# Patient Record
Sex: FEMALE | Race: BLACK OR AFRICAN AMERICAN | Employment: FULL TIME | URBAN - METROPOLITAN AREA
[De-identification: names, ages, dates, MRNs, and addresses within clinical notes are randomized per-mention and may not be internally consistent; named-entity substitution may affect disease eponyms.]

---

## 2022-06-02 ENCOUNTER — OFFICE VISIT (OUTPATIENT)
Dept: PULMONOLOGY | Age: 42
End: 2022-06-02
Payer: COMMERCIAL

## 2022-06-02 VITALS
HEIGHT: 60 IN | RESPIRATION RATE: 14 BRPM | OXYGEN SATURATION: 100 % | TEMPERATURE: 98 F | DIASTOLIC BLOOD PRESSURE: 75 MMHG | HEART RATE: 76 BPM | SYSTOLIC BLOOD PRESSURE: 118 MMHG | BODY MASS INDEX: 38.09 KG/M2 | WEIGHT: 194 LBS

## 2022-06-02 DIAGNOSIS — J18.9 PNEUMONIA DUE TO INFECTIOUS ORGANISM, UNSPECIFIED LATERALITY, UNSPECIFIED PART OF LUNG: ICD-10-CM

## 2022-06-02 DIAGNOSIS — R91.8 ABNORMAL CT LUNG SCREENING: Primary | ICD-10-CM

## 2022-06-02 DIAGNOSIS — R05.9 COUGH: ICD-10-CM

## 2022-06-02 DIAGNOSIS — R91.8 PULMONARY INFILTRATES: ICD-10-CM

## 2022-06-02 LAB
EXPIRATORY TIME: NORMAL
FEF 25-75% %PRED-PRE: NORMAL
FEF 25-75% PRED: NORMAL
FEF 25-75%-PRE: NORMAL
FEV1 %PRED-PRE: 83 %
FEV1 PRED: NORMAL
FEV1/FVC %PRED-PRE: NORMAL
FEV1/FVC PRED: NORMAL
FEV1/FVC: 69 %
FEV1: 1.79 L
FVC %PRED-PRE: 100 %
FVC PRED: NORMAL
FVC: 2.61 L
PEF %PRED-PRE: NORMAL
PEF PRED: NORMAL
PEF-PRE: NORMAL

## 2022-06-02 PROCEDURE — 94010 BREATHING CAPACITY TEST: CPT | Performed by: INTERNAL MEDICINE

## 2022-06-02 PROCEDURE — 99205 OFFICE O/P NEW HI 60 MIN: CPT | Performed by: INTERNAL MEDICINE

## 2022-06-02 RX ORDER — QUETIAPINE FUMARATE 100 MG/1
TABLET, FILM COATED ORAL
COMMUNITY
Start: 2022-05-11

## 2022-06-02 RX ORDER — SERTRALINE HYDROCHLORIDE 100 MG/1
100 TABLET, FILM COATED ORAL DAILY
COMMUNITY
Start: 2021-06-02

## 2022-06-02 RX ORDER — BENZONATATE 200 MG/1
200 CAPSULE ORAL 3 TIMES DAILY PRN
COMMUNITY
Start: 2022-05-18

## 2022-06-02 RX ORDER — HYDROXYZINE HYDROCHLORIDE 10 MG/1
10 TABLET, FILM COATED ORAL 3 TIMES DAILY PRN
COMMUNITY
Start: 2021-06-02

## 2022-06-02 ASSESSMENT — PULMONARY FUNCTION TESTS
FEV1_PERCENT_PREDICTED_PRE: 83
FEV1/FVC: 69
FEV1: 1.79
FVC: 2.61
FVC_PERCENT_PREDICTED_PRE: 100

## 2022-06-02 ASSESSMENT — ENCOUNTER SYMPTOMS
COUGH: 1
SHORTNESS OF BREATH: 1
DIARRHEA: 1

## 2022-06-02 NOTE — PROGRESS NOTES
[address] [facility]. [address], [address] [facility]  [phone]    Patient Name:  Coco Oliva  YOB: 1980      Office Visit 6/2/2022    CHIEF COMPLAINT:    Chief Complaint   Patient presents with    Breathing Problem       HISTORY OF PRESENT ILLNESS:      Patient is 42years old -American female who is here today and referral of Dr. Ginette Beckford for the evaluation of pneumonia. Has history of breath cause in the past and has been on eloquence for long time. She reports symptoms in April she developed shortness of breath some coughing she felt like she was having cold however then she developed sharp pains in her chest and she was worried she has blood clot therefore she presented to emergency room where she was evaluated. She was told she has pneumonia. She had CT scan done at that time which showed some ground glass opacities in the right lung. She was treated and discharged after 24 hours. Today she reports she feels better however she still has some chest tightness occasionally. Especially she took her spirometry today she felt this chest tightness. She never had anything like this before. She still has some coughing occasionally yellow sputum production. She denies any wheezing however she did have wheezing before. She does have postnasal drainage which he normally does not have right since pneumonia she has it. She denies any nasal congestion or really allergies. Her sister has inhaler and she recently tried to use it and she said it helped her. She never had pneumonia before. She denies any other symptoms such as rash swelling. Recently she also was found to have right breast mass and she is been evaluated for that. Patient has never smoked. Past Medical History:   Diagnosis Date    Anxiety     Hx of blood clots          There is no problem list on file for this patient.           Past Surgical History:   Procedure Laterality Date    HYSTERECTOMY      TUBAL LIGATION Social History     Socioeconomic History    Marital status: Single     Spouse name: Not on file    Number of children: Not on file    Years of education: Not on file    Highest education level: Not on file   Occupational History    Not on file   Tobacco Use    Smoking status: Never Smoker    Smokeless tobacco: Never Used   Substance and Sexual Activity    Alcohol use: Not on file    Drug use: Not on file    Sexual activity: Not on file   Other Topics Concern    Not on file   Social History Narrative    Not on file     Social Determinants of Health     Financial Resource Strain:     Difficulty of Paying Living Expenses: Not on file   Food Insecurity:     Worried About Running Out of Food in the Last Year: Not on file    Wilmer of Food in the Last Year: Not on file   Transportation Needs:     Lack of Transportation (Medical): Not on file    Lack of Transportation (Non-Medical): Not on file   Physical Activity:     Days of Exercise per Week: Not on file    Minutes of Exercise per Session: Not on file   Stress:     Feeling of Stress : Not on file   Social Connections:     Frequency of Communication with Friends and Family: Not on file    Frequency of Social Gatherings with Friends and Family: Not on file    Attends Church Services: Not on file    Active Member of [employer] or Organizations: Not on file    Attends Club or Organization Meetings: Not on file    Marital Status: Not on file   Intimate Partner Violence:     Fear of Current or Ex-Partner: Not on file    Emotionally Abused: Not on file    Physically Abused: Not on file    Sexually Abused: Not on file   Housing Stability:     Unable to Pay for Housing in the Last Year: Not on file    Number of [place] in the Last Year: Not on file    Unstable Housing in the Last Year: Not on file         History reviewed. No pertinent family history.       Not on File      Current Outpatient Medications   Medication Sig    apixaban (ELIQUIS) 2.5 MG TABS tablet Take 2.5 mg by mouth 2 times daily    benzonatate (TESSALON) 200 MG capsule Take 200 mg by mouth 3 times daily as needed    hydrOXYzine (ATARAX) 10 MG tablet Take 10 mg by mouth 3 times daily as needed    QUEtiapine (SEROQUEL) 100 MG tablet     sertraline (ZOLOFT) 100 MG tablet Take 100 mg by mouth daily     No current facility-administered medications for this visit. REVIEW OF SYSTEMS:     Review of Systems   Constitutional: Positive for unexpected weight change. Respiratory: Positive for cough and shortness of breath. Gastrointestinal: Positive for diarrhea. Psychiatric/Behavioral: The patient is nervous/anxious. .      PHYSICAL EXAM:    Vitals:    06/02/22 1324   BP: 118/75   Pulse: 76   Resp: 14   Temp: 98 °F (36.7 °C)   SpO2: 100%        GENERAL APPEARANCE:   The patient is normal weight and in no respiratory distress. HEENT:   PERRL. Conjunctivae unremarkable. Nasal mucosa is without epistaxis, exudate, or polyps. Gums and dentition are unremarkable. There is no oropharyngeal narrowing. TMs are clear. NECK/LYMPHATIC:   Symmetrical with no elevation of jugular venous pulsation. Trachea midline. No thyroid enlargement. No cervical adenopathy. LUNGS:   Normal respiratory effort with symmetrical lung expansion. Breath sounds clear   HEART:   There is a regular rate and rhythm. No murmur, rub, or gallop. There is no edema in the lower extremities. ABDOMEN:   Soft and non-tender. No hepatosplenomegaly. Bowel sounds are normal.     SKIN:   There are no rashes, cyanosis, jaundice, or ecchymosis present. EXTREMITIES:   The extremities are unremarkable without clubbing, cyanosis, joint inflammation, degenerative, or ischemic change. MUSCULOSKELETAL:   There is no abnormal tone, muscle atrophy, or abnormal movement present. NEURO:   The patient is alert and oriented to person, place, and time.   Memory appears intact and mood is normal.  No gross sensorimotor deficits are present. DIAGNOSTIC TESTS:   PCXR: No valid procedures specified. CXR PA and lateral:  No results found for this or any previous visit. Screening chest CT: No results found for this or any previous visit. CT of chest without contrast:   No results found for this or any previous visit. CT of chest with contrast:  No valid procedures specified. PET/CT: No valid procedures specified. Spirometry:    Office Spirometry Results Latest Ref Rng & Units 6/2/2022   FVC L 2.61   FEV1 L 1.79   FEV1 %PRED-PRE % 83   FVC %PRED-PRE % 100   FEV1/FVC % 69              ASSESSMENT:  (Medical Decision Making)        Diagnosis Orders   1. Abnormal CT lung screening  Spirometry Without Bronchodilator    Spirometry Without Bronchodilator    CT CHEST WO CONTRAST   2. Cough      She does have some symptoms which could be suggesting asthma therefore I will give her trial of inhaler and see how she does    3. Pneumonia due to infectious organism, unspecified laterality, unspecified part of lung    Will need follow up C\T scan     4. Pulmonary infiltrates    I have reviewed her CT scan she does have GGO mostly in  right lung. Look like she could have some viral pneumonia. She will need follow-up to scan to make sure they have resolved. If there persistent she may need bronchoscopy. With her history of blood clots in the past she may have some inflammatory conditions such as lupus which if there persisted would have to be looked into. She has a family history of lupus.            PLAN:  - trial of breo  - add nasal steroid  - follow Ct scan in 2 months with follow up appointment afterwards         Orders Placed This Encounter   Procedures    CT CHEST WO CONTRAST     Standing Status:   Future     Standing Expiration Date:   7/5/2022    Spirometry Without Bronchodilator     Standing Status:   Future     Number of Occurrences:   1     Standing Expiration Date:   6/2/2023       No orders of the defined types were placed in this encounter. Over 50% of today's office visit was spent in face to face time reviewing test results/records, prognosis, importance of compliance, education about disease process, benefits of medications, instructions for management of acute flare-ups, and follow up plans. Total time spent was 60  minutes. Rosalinda Rossi MD  Electronically signed    Dictated using voice recognition software.   Proof read but unrecognized errors may exist.